# Patient Record
Sex: MALE | Race: WHITE
[De-identification: names, ages, dates, MRNs, and addresses within clinical notes are randomized per-mention and may not be internally consistent; named-entity substitution may affect disease eponyms.]

---

## 2018-07-22 ENCOUNTER — HOSPITAL ENCOUNTER (EMERGENCY)
Dept: HOSPITAL 26 - MED | Age: 33
Discharge: HOME | End: 2018-07-22
Payer: MEDICAID

## 2018-07-22 VITALS — SYSTOLIC BLOOD PRESSURE: 128 MMHG | DIASTOLIC BLOOD PRESSURE: 86 MMHG

## 2018-07-22 VITALS — BODY MASS INDEX: 30.16 KG/M2 | WEIGHT: 199 LBS | HEIGHT: 68 IN

## 2018-07-22 VITALS — SYSTOLIC BLOOD PRESSURE: 133 MMHG | DIASTOLIC BLOOD PRESSURE: 76 MMHG

## 2018-07-22 DIAGNOSIS — S05.91XA: ICD-10-CM
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
## 2018-07-22 DIAGNOSIS — S05.92XA: Primary | ICD-10-CM
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
## 2018-07-22 PROCEDURE — 96372 THER/PROPH/DIAG INJ SC/IM: CPT

## 2018-07-22 PROCEDURE — 99284 EMERGENCY DEPT VISIT MOD MDM: CPT

## 2018-07-22 NOTE — NUR
C/O BILAT EYE REDNESS, IRRITATION AND INCREASED TEAR PRODUCTION X 4 DAYS. 
STATES HE WAS WORKING OUTSIDE ON HIS CAR WHEN IT STARTED, AND NOW ITS HARD TO 
KEEP THEM OPEN WITHOUT PAIN. DENIES VISUAL DISTURBANCE. +REDNESS. 



HX: DENIES

## 2018-07-22 NOTE — NUR
Patient discharged with v/s stable. Written and verbal after care instructions 
given and explained. 

Patient alert, oriented and verbalized understanding of instructions. 
Ambulatory with steady gait. All questions addressed prior to discharge. ID 
band removed. Patient advised to follow up with PMD. Rx of naphcon, loratadine 
given. Patient educated on indication of medication including possible reaction 
and side effects. Opportunity to ask questions provided and answered.

## 2018-07-25 ENCOUNTER — HOSPITAL ENCOUNTER (EMERGENCY)
Dept: HOSPITAL 26 - MED | Age: 33
Discharge: HOME | End: 2018-07-25
Payer: MEDICAID

## 2018-07-25 VITALS — WEIGHT: 200 LBS | HEIGHT: 68 IN | BODY MASS INDEX: 30.31 KG/M2

## 2018-07-25 VITALS — DIASTOLIC BLOOD PRESSURE: 99 MMHG | SYSTOLIC BLOOD PRESSURE: 137 MMHG

## 2018-07-25 VITALS — SYSTOLIC BLOOD PRESSURE: 137 MMHG | DIASTOLIC BLOOD PRESSURE: 99 MMHG

## 2018-07-25 DIAGNOSIS — H10.9: Primary | ICD-10-CM

## 2018-07-25 PROCEDURE — 99284 EMERGENCY DEPT VISIT MOD MDM: CPT

## 2018-07-25 PROCEDURE — 96372 THER/PROPH/DIAG INJ SC/IM: CPT

## 2018-07-25 NOTE — NUR
Patient discharged with v/s stable. Written and verbal after care instructions 
given and explained. 

Patient alert, oriented and verbalized understanding of instructions. 
Ambulatory with steady gait. All questions addressed prior to discharge. ID 
band removed. Patient advised to follow up with PMD. Rx of DOXYCYCLINE AND 
ERYTHROMYCIN given. Patient educated on indication of medication including 
possible reaction and side effects. Opportunity to ask questions provided and 
answered.

## 2018-07-25 NOTE — NUR
PATIENT PRESENTS TO ED WITH COMPLAINTS OF BILATRERAL EYE IRRITATION. PATIENT 
WAS SEEN IN ER 3 DAYS AGO AND WAS GIVEN SOME MEDS BUT ARE NOT HELPING. PATIENTS 
EYES APPEAR RED, IRRITATED, AND ARE WATERY. EXUDATE NOTED ON OUTTER CORNERS OF 
BILATERAL EYES. PATIENT DENIES TRAUMA, DENIES VISION CHANGES. TURNED LIGHTS FOR 
PATIENT COMFORT; HOB UP, X1 BED RAIL UP; BED IN LOWEST POSITION. EYE TRAY SET 
UP FOR MD. ED MD MADE AWARE OF PT STATUS.

## 2018-07-29 ENCOUNTER — HOSPITAL ENCOUNTER (EMERGENCY)
Dept: HOSPITAL 26 - MED | Age: 33
Discharge: HOME | End: 2018-07-29
Payer: MEDICAID

## 2018-07-29 VITALS — SYSTOLIC BLOOD PRESSURE: 132 MMHG | DIASTOLIC BLOOD PRESSURE: 78 MMHG

## 2018-07-29 VITALS — HEIGHT: 68 IN | WEIGHT: 195 LBS | BODY MASS INDEX: 29.55 KG/M2

## 2018-07-29 VITALS — SYSTOLIC BLOOD PRESSURE: 138 MMHG | DIASTOLIC BLOOD PRESSURE: 85 MMHG

## 2018-07-29 DIAGNOSIS — H10.9: Primary | ICD-10-CM

## 2018-07-29 NOTE — NUR
33/M PRESENT TO ER C/O EYE PAIN x 10 DAYS. PT AA0x4, BREATHING EVEN AND 
UNLABORED. PT STATES BOTH EYES ARE PAINFUL 8/10 BURNING NON-RADIATING PAIN . PT 
STATES HE HAS BEEN SEEN AT [facility] ER TWICE ALREADY IN THE LAST 10 DAYS. ERMD 
NOTIFIED OF PATIENT STATUS.

## 2020-12-04 ENCOUNTER — HOSPITAL ENCOUNTER (EMERGENCY)
Dept: HOSPITAL 26 - MED | Age: 35
Discharge: HOME | End: 2020-12-04
Payer: MEDICAID

## 2020-12-04 VITALS — DIASTOLIC BLOOD PRESSURE: 86 MMHG | SYSTOLIC BLOOD PRESSURE: 144 MMHG

## 2020-12-04 VITALS — HEIGHT: 66 IN | BODY MASS INDEX: 29.09 KG/M2 | WEIGHT: 181 LBS

## 2020-12-04 DIAGNOSIS — T40.1X1A: Primary | ICD-10-CM

## 2020-12-04 DIAGNOSIS — Z98.890: ICD-10-CM

## 2020-12-04 DIAGNOSIS — F17.210: ICD-10-CM

## 2021-10-19 ENCOUNTER — HOSPITAL ENCOUNTER (EMERGENCY)
Dept: HOSPITAL 26 - MED | Age: 36
Discharge: HOME | End: 2021-10-19
Payer: MEDICAID

## 2021-10-19 VITALS — DIASTOLIC BLOOD PRESSURE: 78 MMHG | SYSTOLIC BLOOD PRESSURE: 149 MMHG

## 2021-10-19 VITALS — BODY MASS INDEX: 30.77 KG/M2 | WEIGHT: 203 LBS | HEIGHT: 68 IN

## 2021-10-19 DIAGNOSIS — L03.113: ICD-10-CM

## 2021-10-19 DIAGNOSIS — L03.114: Primary | ICD-10-CM

## 2021-10-19 RX ADMIN — IBUPROFEN ONE MG: 600 TABLET ORAL at 11:16

## 2021-10-19 NOTE — NUR
37 Y/O M BIB SELF FROM HOME, PATIENT PRESENTS TO ED WITH BILATERAL ARM SWELLING 
WITH REDNESS FOR 1 MO. PT STATES HE USES HEROIN DAILY AND HAS BEEN HAVING 
INCREASED SWELLING AND PAIN. DENIES N/V/D; SKIN IS PINK/WARM/DRY, EDEMA AND 
REDNESS ON BILATERAL UPPER EXTREMITIES; AAOX4 WITH EVEN AND STEADY GAIT; LUNGS 
CLEAR BL; HR EVEN AND REGULAR; PT DENIES ANY FEVER, CP, SOB, OR COUGH AT THIS 
TIME; PATIENT STATES PAIN OF 8/10 AT THIS TIME; VSS; PATIENT POSITIONED FOR 
COMFORT; HOB ELEVATED; BEDRAILS UP X2; BED DOWN. ER MD MADE AWARE OF PT STATUS.



PMH: HEP C, DRUG USE

NKA

MED: DENIES

## 2021-11-05 ENCOUNTER — HOSPITAL ENCOUNTER (EMERGENCY)
Dept: HOSPITAL 26 - MED | Age: 36
Discharge: HOME | End: 2021-11-05
Payer: MEDICAID

## 2021-11-05 VITALS — DIASTOLIC BLOOD PRESSURE: 82 MMHG | SYSTOLIC BLOOD PRESSURE: 175 MMHG

## 2021-11-05 VITALS — BODY MASS INDEX: 30.31 KG/M2 | HEIGHT: 68 IN | WEIGHT: 200 LBS

## 2021-11-05 DIAGNOSIS — F19.10: ICD-10-CM

## 2021-11-05 DIAGNOSIS — L03.115: Primary | ICD-10-CM

## 2021-11-05 DIAGNOSIS — Z79.899: ICD-10-CM

## 2021-11-05 DIAGNOSIS — L03.116: ICD-10-CM

## 2021-11-05 NOTE — NUR
36 Y MALE FROM DUE TO BILATEARL UE PAIN S/P TO MULTIPLE ABCESS. PT STATED THE 
ABCESS FORMATION STARTED X2 WEEKS AGO AND HAS PROGRESSIVELY GOTTEN WORSE OVER 
TIME. PT STATED ABCESS IS DUE TO HEROIN INJECTIONS. REDNESS NOTED ON L UE. PAIN 
CURRENTLY 10/10. BED IS AT THE LOWEST LEVEL, SIDERAILX1, AND CALL LIGHT WITHIN 
IN REACH. 



PMH: DENIES



NKA

## 2021-11-05 NOTE — NUR
Patient discharged with v/s stable. Written and verbal after care instructions 
ABOUT CELLULITIS given and explained. 

Patient alert, oriented and verbalized understanding of instructions. 
Ambulatory with steady gait. All questions addressed prior to discharge. ID 
band removed. Patient advised to follow up with PMD. Rx of CLINDAMYCIN AND 
IBUPROFEN given. Patient educated on indication of medication including 
possible reaction and side effects. Opportunity to ask questions provided and 
answered.

## 2021-11-27 ENCOUNTER — HOSPITAL ENCOUNTER (EMERGENCY)
Dept: HOSPITAL 26 - MED | Age: 36
Discharge: HOME | End: 2021-11-27
Payer: MEDICAID

## 2021-11-27 VITALS — HEIGHT: 68 IN | WEIGHT: 200 LBS | BODY MASS INDEX: 30.31 KG/M2

## 2021-11-27 VITALS — SYSTOLIC BLOOD PRESSURE: 129 MMHG | DIASTOLIC BLOOD PRESSURE: 71 MMHG

## 2021-11-27 DIAGNOSIS — F19.90: ICD-10-CM

## 2021-11-27 DIAGNOSIS — Z79.899: ICD-10-CM

## 2021-11-27 DIAGNOSIS — L03.114: ICD-10-CM

## 2021-11-27 DIAGNOSIS — L03.113: Primary | ICD-10-CM

## 2021-11-27 DIAGNOSIS — F11.90: ICD-10-CM

## 2021-11-27 PROCEDURE — 99283 EMERGENCY DEPT VISIT LOW MDM: CPT

## 2021-11-27 PROCEDURE — 96372 THER/PROPH/DIAG INJ SC/IM: CPT

## 2021-11-27 NOTE — NUR
Patient discharged with v/s stable. Written and verbal after care instructions 
given and explained. 

Patient alert, oriented and verbalized understanding of instructions. 
Ambulatory with steady gait. All questions addressed prior to discharge. ID 
band removed. Patient advised to follow up with PMD. Rx of KEFLEX, NAPROXEN, 
AND BACTRIM given. Patient educated on indication of medication including 
possible reaction and side effects. Opportunity to ask questions provided and 
answered.

## 2021-11-27 NOTE — NUR
36 Y MALE WITH C/O AGATHA ARMS PAIN, REDNESS, SWELLING S/P HEROIN INJECTION. PT 
WAS LAST SEEN HERE SAME S/S FOR CELLULITIS 11/5/21. PT STATED PAIN IS CURRENTLY 
10/10 AND THROBBING LIKE PAIN AT THIS TIME. PT DENIES ANY CHEST PAIN/SOB AT 
THIS TIME. REDNESS/SWELLING NOTED FOR BILATERAL UE



PMH: DRUG USE



NKA

## 2021-11-28 ENCOUNTER — HOSPITAL ENCOUNTER (EMERGENCY)
Dept: HOSPITAL 26 - MED | Age: 36
Discharge: HOME | End: 2021-11-28
Payer: MEDICAID

## 2021-11-28 VITALS — SYSTOLIC BLOOD PRESSURE: 130 MMHG | DIASTOLIC BLOOD PRESSURE: 79 MMHG

## 2021-11-28 VITALS — DIASTOLIC BLOOD PRESSURE: 85 MMHG | SYSTOLIC BLOOD PRESSURE: 137 MMHG

## 2021-11-28 VITALS — BODY MASS INDEX: 30.31 KG/M2 | HEIGHT: 68 IN | WEIGHT: 200 LBS

## 2021-11-28 DIAGNOSIS — L03.113: Primary | ICD-10-CM

## 2021-11-28 DIAGNOSIS — L03.114: ICD-10-CM

## 2021-11-28 DIAGNOSIS — F17.210: ICD-10-CM

## 2021-11-28 PROCEDURE — 99283 EMERGENCY DEPT VISIT LOW MDM: CPT

## 2021-11-28 NOTE — NUR
37 Y/O MALE C/O BILATERAL HAND AND ARM PAIN 9/10 DESCRIBES AS THROBBING WITH 
SWELLING NOTED S/P HEROIN INJECTION X  6 WEEKS. PT WAS SEEN HERE YESTERDAY DX 
WITH CELLULITIS PRESCRIBED WITH ABX AND PAIN MEDICATION. DENIES N/V, DENIES 
FEVER/CHILLS.



PMH: HEROIN USE



NKA

## 2021-11-28 NOTE — NUR
Patient discharged with v/s stable. Written and verbal after care instructions 
given CELLULTIS and explained. 

Patient alert, oriented and verbalized understanding of instructions. 
Ambulatory with steady gait. All questions addressed prior to discharge. ID 
band removed. Patient advised to follow up with PMD. Rx of PREDNISONE given. 
Patient educated on indication of medication including possible reaction and 
side effects. Opportunity to ask questions provided and answered.